# Patient Record
Sex: FEMALE | Race: WHITE | Employment: PART TIME | URBAN - METROPOLITAN AREA
[De-identification: names, ages, dates, MRNs, and addresses within clinical notes are randomized per-mention and may not be internally consistent; named-entity substitution may affect disease eponyms.]

---

## 2019-05-17 ENCOUNTER — HOSPITAL ENCOUNTER (EMERGENCY)
Facility: CLINIC | Age: 29
Discharge: HOME | End: 2019-05-17
Attending: PHYSICIAN ASSISTANT | Admitting: PHYSICIAN ASSISTANT

## 2019-05-17 VITALS
SYSTOLIC BLOOD PRESSURE: 115 MMHG | HEART RATE: 109 BPM | RESPIRATION RATE: 16 BRPM | OXYGEN SATURATION: 96 % | WEIGHT: 235 LBS | DIASTOLIC BLOOD PRESSURE: 79 MMHG

## 2019-05-17 DIAGNOSIS — R04.0 EPISTAXIS: ICD-10-CM

## 2019-05-17 PROCEDURE — 99283 EMERGENCY DEPT VISIT LOW MDM: CPT

## 2019-05-17 RX ORDER — LORATADINE 10 MG/1
10 TABLET ORAL DAILY
COMMUNITY

## 2019-05-17 RX ORDER — TRANEXAMIC ACID 100 MG/ML
500 INJECTION, SOLUTION INTRAVENOUS ONCE
Status: DISCONTINUED | OUTPATIENT
Start: 2019-05-17 | End: 2019-05-17 | Stop reason: HOSPADM

## 2019-05-17 RX ORDER — CHOLECALCIFEROL (VITAMIN D3) 1250 MCG
50000 CAPSULE ORAL
COMMUNITY

## 2019-05-17 RX ORDER — ESCITALOPRAM OXALATE 5 MG/1
5 TABLET ORAL DAILY
COMMUNITY

## 2019-05-17 RX ORDER — CALCIUM CARBONATE 500(1250)
1 TABLET ORAL 2 TIMES DAILY
COMMUNITY

## 2019-05-17 RX ORDER — OXYMETAZOLINE HYDROCHLORIDE 0.05 G/100ML
SPRAY NASAL
Status: DISCONTINUED
Start: 2019-05-17 | End: 2019-05-17 | Stop reason: HOSPADM

## 2019-05-17 NOTE — ED TRIAGE NOTES
Nose bleed started this am about 8:30, lasted about 35 minutes.  Second nose bleed at 9:30.  Third nose bleed started about 12 noon.  Pt denies taking blood thinner.

## 2019-05-17 NOTE — DISCHARGE INSTRUCTIONS
"*Avoid picking your nose or blowing it hard.  *No new medications. Vaseline or antibiotic ointment to the inside of the nares nightly.   If you have a nosebleed, expel all clots, spray Afrin, and place nasal clamp for 15 minutes. You may replace the clamp if persistent bleeding for another 15-20 minutes.  If you have persistent bleeding return to the emergency department.  *Follow-up with ENT in the next 1-2 weeks for recurrent nosebleeds.  *Return if you develop recurrence, faint or feel like you will faint or become worse in any way.      Discharge Instructions  Epistaxis    Today you were seen for a nosebleed.   Nosebleeds (the medical term is \"epistaxis\") are very common. Almost every person has had at least one in their lifetime.  Although the amount of blood loss can appear dramatic, nosebleeds rarely cause serious problems.  The most common causes are dry air or nose picking, but they also are common in people who have allergies, high blood pressure or are on blood thinners, such as Coumadin, Aspirin or Plavix. If you or your child gets a nosebleed, the important thing is to know how to take care of it. With the right self-care, most nosebleeds will stop on their own.    Return to the Emergency Department if:  Your nose is bleeding a very large amount of blood.  You get very pale, faint, or tired.  You cannot get the bleeding to stop after following these instructions.  A nosebleed happens after recent nasal surgery or if you have a known nasal tumor.  You have new, serious symptoms, such as chest pain.  You get a nosebleed after an injury, such as being hit in the face, and you are concerned that you could have other injuries (like a broken bone).    Treatment:  Your doctor may tell you to use a decongestant nose spray, like Afrin  (oxymetazoline), in both nostrils in the morning and at night for the next three days. Do not use this medicine for more than three days at a time.  If you do it will cause nasal " congestion.   You should apply a small amount of Vaseline  to the inside of your nostrils for moisture before bed.  Using a humidifier in your bedroom will help as well.  For the next three days, do not blow your nose or put anything in your nose. You may sniffle, or dab the outside of your nose.  Do not bend with your head below your waist for the next three days. Do not lift anything so heavy that you have to strain.   If you received nasal packing, please do not remove the packing until seen by an Ear Nose and Throat specialist.  If antibiotics have been given with the packing please take as directed.    If your nose starts to bleed again:  Blow your nose to get rid of some of the clots that have formed inside your nostrils. This may increase the bleeding temporarily, but that's OK.  Spray decongestant nose spray (like Afrin ) into both nostrils to constrict the blood vessels.  Sit or stand while bending forward slightly at the waist. Do not lie down or tilt your head back. This may cause you to swallow blood and can lead to vomiting.   the soft part of BOTH nostrils at the bottom of your nose and squeeze your nose closed for at least 5 minutes (for children) or 10 to 15 minutes (for adults). You can use your fingers, or the clip we gave you here. Use a clock to time yourself. Do not release the pressure every so often to check whether the bleeding has stopped. Many people hurt their chances of stopping the bleeding by releasing the pressure too soon or too often.    If you follow the steps outlined above, and your nose continues to bleed, repeat all the steps once more. Apply pressure for a total of at least 30 minutes. If you continue to bleed even then, return to the Emergency Department or go to an urgent care clinic.    If you keep having nose bleeds, schedule an appointment with your regular doctor, or with an Ear, Nose, and Throat Specialist.  If you were given a prescription for medicine here today,  be sure to read all of the information (including the package insert) that comes with your prescription.  This will include important information about the medicine, its side effects, and any warnings that you need to know about.  The pharmacist who fills the prescription can provide more information and answer questions you may have about the medicine.  If you have questions or concerns that the pharmacist cannot address, please call or return to the Emergency Department.

## 2019-05-17 NOTE — ED PROVIDER NOTES
History     Chief Complaint:  Epistaxis    HPI   Ludin Elise is a generally healthy 28 year old female who presents with epistaxis. The patient states that she has a history of epistaxis, but notes that they usually resolve after 25 minutes. She has not followed with ENT for this issue nor required ED interventions in the past. Today, the patient states that she had one episode of epistaxis at 0830 and resolved after a half hour, a second episode of epistaxis at 0930 that was smaller and resolved at a half hour. At 1130 today, the patient notes that she had developed the worst epistaxis she has had with no relief, prompting her ED visit. Here, the patient denies any associated symptoms. She is not currently on any blood thinners nor take daily aspirin or ibuprofen.     Allergies:  NKDA     Medications:    The patient is currently on no regular medications.     Past Medical History:    The patient denies any significant past medical history.     Past Surgical History:    The patient does not have any pertinent past surgical history.     Family History:    No past pertinent family history.     Social History:  Presents with her friend.  Negative for alcohol use.  Negative for tobacco use.     Review of Systems   HENT: Positive for nosebleeds.    All other systems reviewed and are negative.      Physical Exam     Patient Vitals for the past 24 hrs:   BP Pulse Resp SpO2 Weight   05/17/19 1228 -- 109 -- 96 % --   05/17/19 1225 115/79 -- 16 -- 106.6 kg (235 lb)      Physical Exam  General: Alert, interactive. GCS 15  Head:  Scalp is atraumatic.  Eyes:  EOM intact. The pupils are equal, round, and reactive to light. No scleral icterus.  ENT:                                      Ears:  The external ears are normal. TM's non-erythematous. External canals normal.    Nose:  The external nose is normal.  Right naris unremarkable.  Left nares with friable tissue to Kiesselbach's plexus.  No septal hematoma.  Throat:  The  oropharynx is normal. Mucus membranes are moist.                 Neck:  Normal range of motion. There is no rigidity.   CV:  Regular rate and rhythm. No murmur. 2+ radial pulses  Resp:  Breath sounds are clear bilaterally. Non-labored, no retractions or accessory muscle use.  MS:  Normal range of motion.   Skin:  Warm and dry.   Neuro:  Strength and sensation grossly intact.   Psych:  Awake. Alert.  Appropriate interactions.     Emergency Department Course   Interventions:  1257 Tranexamic acid, cotton ball intranasal  1257 Afrin 0.05%, cotton ball intranasal     Emergency Department Course:  Nursing notes and vitals reviewed. Nasal clamp placed.    1257  I performed an exam of the patient as documented above.     1302 Nasal clamped removed and bleeding has resolved.     Medicine administered as documented above.     1325 I rechecked the patient and discussed the results of her workup thus far.     Findings and plan explained to the Patient. Patient discharged home with instructions regarding supportive care, medications, and reasons to return. The importance of close follow-up was reviewed.     Impression & Plan      Medical Decision Making:  Ludin Elise is a 28 year old female who presents for evaluation of nasal bleeding and epistaxis.  Patient had no further bleeding after 30 minutes of nasal clamp.  Empirically, the nose was treated with lidocaine with epinephrine, TXA, and Afrin soaked cotton balls and the clamp was replaced for 10 minutes.  No further bleeding here in the emergency department.  Discussed home care management including Vaseline to the nares before bed.   There are no signs of coagulopathy causing the bleeding or a general medical condition (thrombocytopenia, DIC, leukemia, etc) causing the bleeding today.  Follow-up with ENT for recurrent nosebleeds.  Return precautions discussed.  Patient agrees with the plan all questions and concerns addressed prior to discharge home.    Diagnosis:     ICD-10-CM   1. Epistaxis R04.0       Disposition:  discharged to home    I, Dania Murphy, am serving as a scribe on 5/17/2019 at 1:30 PM to personally document services performed by Sylvia Rice PA-C based on my observations and the provider's statements to me.      Dania Murphy  5/17/2019   [facility] EMERGENCY DEPARTMENT       Sylvia Rice PA-C  05/17/19 1841

## 2019-05-17 NOTE — ED AVS SNAPSHOT
[facility] Emergency Department  [address]  [facility] [address]  Phone:  [phone]  Fax:  [phone]                                    Ludin Elise   MRN: [id number]    Department:  [facility] Emergency Department   Date of Visit:  5/17/2019           After Visit Summary Signature Page    I have received my discharge instructions, and my questions have been answered. I have discussed any challenges I see with this plan with the nurse or doctor.    ..........................................................................................................................................  Patient/Patient Representative Signature      ..........................................................................................................................................  Patient Representative Print Name and Relationship to Patient    ..................................................               ................................................  Date                                   Time    ..........................................................................................................................................  Reviewed by Signature/Title    ...................................................              ..............................................  Date                                               Time          22EPIC Rev 08/18

## 2023-12-12 ENCOUNTER — NURSE TRIAGE (OUTPATIENT)
Dept: NURSING | Facility: CLINIC | Age: 33
End: 2023-12-12

## 2023-12-13 NOTE — TELEPHONE ENCOUNTER
Patient calling reports she was bit by a wild mouse. Denies major bleeding. Advised per protocol to go to the ER with any bite from an animal with unknown vaccine history. Patient agreeable to the plan.     Riana Cardoza RN 12/12/23 8:45 PM    Health Triage Nurse Advisor      Reason for Disposition   [1] Any break in skin from BITE (e.g., cut, puncture or scratch) AND[2] WILD animal at risk for RABIES (e.g., bat, raccoon, gonzalez, skunk, coyote, other carnivores; see Background for list.)    Additional Information   Negative: [1] Major bleeding (e.g., actively dripping or spurting) AND [2] can't be stopped   Negative: Sounds like a life-threatening emergency to the triager   Negative: Snake bite   Negative: Bite, wound, or sting from fish    Protocols used: Animal Bite-A-

## 2024-05-23 ENCOUNTER — NURSE TRIAGE (OUTPATIENT)
Dept: NURSING | Facility: CLINIC | Age: 34
End: 2024-05-23

## 2024-05-24 NOTE — TELEPHONE ENCOUNTER
Nurse Triage SBAR    Is this a 2nd Level Triage? NO    Situation: Tosillectomy Post Op    Background: :Patient is post op 6 days    Assessment: Patient calling to report pain not improving, 7/10 pain with alternating ibuprofen/acetaminophen and oxycodone. She reports that she is having pain with swallowing, but is staying hydrated. No access to a thermometer. Denies difficulty breathing, bleeding, drooling, or vomiting.    Protocol Recommended Disposition:   According to the protocol, patient should call surgeon office in the morning.  Care advice given.     CALL BACK IF: * Any bleeding from the mouth or nose * Any trouble breathing * Signs of dehydration * Fever goes above 104 F (40 C) * You become worse     Patient verbalizes understanding and agrees with plan of care.     Mariah Gonzalez RN  05/23/24 8:56 PM  [facility] Nurse Advisor    Reason for Disposition   [1] Caller has NON-URGENT question AND [2] triager unable to answer question    Additional Information   Negative: SEVERE difficulty breathing (e.g., struggling for each breath, speaks in single words, stridor)   Negative: SEVERE bleeding (e.g., spitting out, coughing up, or vomiting a LARGE AMOUNT of blood; such as continuous fresh bleeding or large blood clots)   Negative: [1] Bleeding AND [2] fainted or too weak to stand   Negative: Sounds like a life-threatening emergency to the triager   Negative: Tonsil injury not from surgery   Negative: [1] MODERATE bleeding (e.g., spitting out or coughing up SMALL AMOUNT of fresh blood) AND [2] one or more times  (Exception: Blood-tinged saliva.)   Negative: [1] Vomiting fresh blood or old blood (coffee-ground vomit) AND [2] small amount one or more times   Negative: Difficulty breathing   Negative: Sounds like a serious complication to the triager   Negative: SEVERE PAIN WITH DYSPHAGIA (e.g., can't swallow any liquids, or drooling)   Negative: [1] Drinking very little AND [2] dehydration suspected  (e.g., no urine > 12 hours, very dry mouth, very lightheaded)   Negative: Fever > 104 F (40 C)   Negative: Patient sounds very sick or weak to the triager   Negative: [1] SEVERE pain (e.g., excruciating, pain scale 8-10) AND [2] not controlled with pain medications   Negative: MODERATE-SEVERE vomiting (e.g., 3 or more times)   Negative: Vomiting lasts > 12 hours   Negative: [1] Refuses to drink anything AND [2] for > 12 hours   Negative: [1] Caller has URGENT question AND [2] triager unable to answer question   Negative: [1] Fever returns after gone for over 24 hours AND [2] symptoms worse or not improved   Negative: Fever present > 3 days (72 hours)   Negative: [1] Big lymph nodes in neck AND [2] new-onset   Negative: [1] Over 48 hours since surgery AND [2] pain is becoming worse   Negative: Fever > 101 F (38.3 C)    Protocols used[facility]

## 2024-06-14 ENCOUNTER — HOSPITAL ENCOUNTER (OUTPATIENT)
Facility: CLINIC | Age: 34
Setting detail: OBSERVATION
Discharge: HOME | End: 2024-06-15
Attending: STUDENT IN AN ORGANIZED HEALTH CARE EDUCATION/TRAINING PROGRAM | Admitting: STUDENT IN AN ORGANIZED HEALTH CARE EDUCATION/TRAINING PROGRAM
Payer: COMMERCIAL

## 2024-06-14 ENCOUNTER — TELEPHONE (OUTPATIENT)
Dept: FAMILY MEDICINE | Facility: CLINIC | Age: 34
End: 2024-06-14
Payer: COMMERCIAL

## 2024-06-14 PROBLEM — R10.9 ABDOMINAL PAIN: Status: ACTIVE | Noted: 2024-06-14

## 2024-06-14 LAB
ALBUMIN SERPL BCG-MCNC: 4.1 G/DL (ref 3.5–5.2)
ALP SERPL-CCNC: 96 U/L (ref 40–150)
ALT SERPL W P-5'-P-CCNC: 14 U/L (ref 0–50)
ANION GAP SERPL CALCULATED.3IONS-SCNC: 14 MMOL/L (ref 7–15)
AST SERPL W P-5'-P-CCNC: 12 U/L (ref 0–45)
BILIRUB SERPL-MCNC: 0.7 MG/DL
BUN SERPL-MCNC: 10.3 MG/DL (ref 6–20)
CALCIUM SERPL-MCNC: 8.9 MG/DL (ref 8.6–10)
CHLORIDE SERPL-SCNC: 100 MMOL/L (ref 98–107)
CREAT SERPL-MCNC: 0.66 MG/DL (ref 0.51–0.95)
DEPRECATED HCO3 PLAS-SCNC: 22 MMOL/L (ref 22–29)
EGFRCR SERPLBLD CKD-EPI 2021: >90 ML/MIN/1.73M2
ERYTHROCYTE [DISTWIDTH] IN BLOOD BY AUTOMATED COUNT: 12.7 % (ref 10–15)
GLUCOSE SERPL-MCNC: 97 MG/DL (ref 70–99)
HCT VFR BLD AUTO: 35.1 % (ref 35–47)
HGB BLD-MCNC: 11.5 G/DL (ref 11.7–15.7)
LIPASE SERPL-CCNC: 24 U/L (ref 13–60)
MCH RBC QN AUTO: 30.3 PG (ref 26.5–33)
MCHC RBC AUTO-ENTMCNC: 32.8 G/DL (ref 31.5–36.5)
MCV RBC AUTO: 93 FL (ref 78–100)
PLATELET # BLD AUTO: 268 10E3/UL (ref 150–450)
POTASSIUM SERPL-SCNC: 3.7 MMOL/L (ref 3.4–5.3)
PROT SERPL-MCNC: 7.1 G/DL (ref 6.4–8.3)
RBC # BLD AUTO: 3.79 10E6/UL (ref 3.8–5.2)
SODIUM SERPL-SCNC: 136 MMOL/L (ref 135–145)
WBC # BLD AUTO: 8.6 10E3/UL (ref 4–11)

## 2024-06-14 PROCEDURE — 83690 ASSAY OF LIPASE: CPT | Performed by: STUDENT IN AN ORGANIZED HEALTH CARE EDUCATION/TRAINING PROGRAM

## 2024-06-14 PROCEDURE — 87637 SARSCOV2&INF A&B&RSV AMP PRB: CPT | Performed by: STUDENT IN AN ORGANIZED HEALTH CARE EDUCATION/TRAINING PROGRAM

## 2024-06-14 PROCEDURE — G0378 HOSPITAL OBSERVATION PER HR: HCPCS

## 2024-06-14 PROCEDURE — 36415 COLL VENOUS BLD VENIPUNCTURE: CPT | Performed by: STUDENT IN AN ORGANIZED HEALTH CARE EDUCATION/TRAINING PROGRAM

## 2024-06-14 PROCEDURE — 87040 BLOOD CULTURE FOR BACTERIA: CPT | Performed by: STUDENT IN AN ORGANIZED HEALTH CARE EDUCATION/TRAINING PROGRAM

## 2024-06-14 PROCEDURE — 85027 COMPLETE CBC AUTOMATED: CPT | Performed by: STUDENT IN AN ORGANIZED HEALTH CARE EDUCATION/TRAINING PROGRAM

## 2024-06-14 PROCEDURE — 99222 1ST HOSP IP/OBS MODERATE 55: CPT | Performed by: STUDENT IN AN ORGANIZED HEALTH CARE EDUCATION/TRAINING PROGRAM

## 2024-06-14 PROCEDURE — G0379 DIRECT REFER HOSPITAL OBSERV: HCPCS

## 2024-06-14 PROCEDURE — 80053 COMPREHEN METABOLIC PANEL: CPT | Performed by: STUDENT IN AN ORGANIZED HEALTH CARE EDUCATION/TRAINING PROGRAM

## 2024-06-14 RX ORDER — ONDANSETRON 2 MG/ML
4 INJECTION INTRAMUSCULAR; INTRAVENOUS EVERY 6 HOURS PRN
Status: DISCONTINUED | OUTPATIENT
Start: 2024-06-14 | End: 2024-06-15 | Stop reason: HOSPADM

## 2024-06-14 RX ORDER — ONDANSETRON 4 MG/1
4 TABLET, ORALLY DISINTEGRATING ORAL EVERY 6 HOURS PRN
Status: DISCONTINUED | OUTPATIENT
Start: 2024-06-14 | End: 2024-06-15 | Stop reason: HOSPADM

## 2024-06-14 RX ORDER — AMOXICILLIN 250 MG
1 CAPSULE ORAL 2 TIMES DAILY PRN
Status: DISCONTINUED | OUTPATIENT
Start: 2024-06-14 | End: 2024-06-15 | Stop reason: HOSPADM

## 2024-06-14 RX ORDER — AMOXICILLIN 250 MG
2 CAPSULE ORAL 2 TIMES DAILY PRN
Status: DISCONTINUED | OUTPATIENT
Start: 2024-06-14 | End: 2024-06-15 | Stop reason: HOSPADM

## 2024-06-14 ASSESSMENT — ACTIVITIES OF DAILY LIVING (ADL)
ADLS_ACUITY_SCORE: 35
ADLS_ACUITY_SCORE: 35

## 2024-06-15 ENCOUNTER — APPOINTMENT (OUTPATIENT)
Dept: CT IMAGING | Facility: CLINIC | Age: 34
End: 2024-06-15
Attending: STUDENT IN AN ORGANIZED HEALTH CARE EDUCATION/TRAINING PROGRAM
Payer: COMMERCIAL

## 2024-06-15 VITALS
DIASTOLIC BLOOD PRESSURE: 57 MMHG | HEART RATE: 74 BPM | TEMPERATURE: 98.6 F | OXYGEN SATURATION: 97 % | SYSTOLIC BLOOD PRESSURE: 115 MMHG | RESPIRATION RATE: 20 BRPM

## 2024-06-15 LAB
C DIFF TOX B STL QL: NEGATIVE
FLUAV RNA SPEC QL NAA+PROBE: NEGATIVE
FLUBV RNA RESP QL NAA+PROBE: NEGATIVE
RSV RNA SPEC NAA+PROBE: NEGATIVE
SARS-COV-2 RNA RESP QL NAA+PROBE: NEGATIVE

## 2024-06-15 PROCEDURE — 87493 C DIFF AMPLIFIED PROBE: CPT | Performed by: INTERNAL MEDICINE

## 2024-06-15 PROCEDURE — G0378 HOSPITAL OBSERVATION PER HR: HCPCS

## 2024-06-15 PROCEDURE — 74176 CT ABD & PELVIS W/O CONTRAST: CPT

## 2024-06-15 PROCEDURE — 99232 SBSQ HOSP IP/OBS MODERATE 35: CPT | Performed by: STUDENT IN AN ORGANIZED HEALTH CARE EDUCATION/TRAINING PROGRAM

## 2024-06-15 PROCEDURE — 99238 HOSP IP/OBS DSCHRG MGMT 30/<: CPT | Performed by: STUDENT IN AN ORGANIZED HEALTH CARE EDUCATION/TRAINING PROGRAM

## 2024-06-15 RX ORDER — ESCITALOPRAM OXALATE 20 MG/1
20 TABLET ORAL DAILY
COMMUNITY

## 2024-06-15 RX ORDER — CETIRIZINE HYDROCHLORIDE 10 MG/1
10 TABLET ORAL DAILY
COMMUNITY

## 2024-06-15 ASSESSMENT — ACTIVITIES OF DAILY LIVING (ADL)
ADLS_ACUITY_SCORE: 18
ADLS_ACUITY_SCORE: 19
ADLS_ACUITY_SCORE: 18

## 2024-06-15 NOTE — PHARMACY-ADMISSION MEDICATION HISTORY
Pharmacy Intern Admission Medication History    Admission medication history is complete. The information provided in this note is only as accurate as the sources available at the time of the update.    Information Source(s): Patient via in-person    Pertinent Information: No fill Hx    Changes made to PTA medication list:  Added: whole list  Deleted: None  Changed: None    Allergies reviewed with patient and updates made in EHR: yes    Medication History Completed By: Zana Miles 6/15/2024 8:31 AM    PTA Med List   Medication Sig Last Dose    cetirizine (ZYRTEC) 10 MG tablet Take 10 mg by mouth daily 6/14/2024 at am    escitalopram (LEXAPRO) 20 MG tablet Take 20 mg by mouth daily 6/14/2024 at am

## 2024-06-15 NOTE — PLAN OF CARE
PRIMARY DIAGNOSIS: Abd pain   OUTPATIENT/OBSERVATION GOALS TO BE MET BEFORE DISCHARGE:  ADLs back to baseline: Yes    Activity and level of assistance: Ambulating independently.    Pain status: Pain free.    Return to near baseline physical activity: Yes     Discharge Planner Nurse   Safe discharge environment identified: Yes  Barriers to discharge: Yes, waiting on cdiff results       Entered by: Rachell Harris RN 06/15/2024 2:19 PM   VSS on room air. Up ind. Tolerating reg diet. Possible discharge today still. Will continue with plan of care  Please review provider order for any additional goals.   Nurse to notify provider when observation goals have been met and patient is ready for discharge.

## 2024-06-15 NOTE — TELEPHONE ENCOUNTER
Internal Medicine    I received a call from Dr. Yaz Osman at [facility] for direct admission for further work up of patient with a fever and colitis.    33 year old female with a history of Rheumatoid Arthritis and Chronic Diarrhea for which a recent normal colonoscopy was reported to me who presented to  today with abdominal pain, diarrhea and fever of 100.1.  Lipase was elevated at 114 with otherwise unremarkable labs reported.  CT scan revealed Sigmoid Colitis.  No mediations were reported to have been administered.  I am unable to review any labs or images in Care Everywhere at this time.    Ayanna Hines MS PA-C  Hospitalist Service

## 2024-06-15 NOTE — PLAN OF CARE
"Goal Outcome Evaluation:    A&O x4   VSS on RA   NPO   CT of ABD completed results pending   Denies nausea   Independent within room  Saline locked   Influenza & covid swab collected & sent to lab, waiting for isolation supplies to arrive     Problem: Adult Inpatient Plan of Care  Goal: Plan of Care Review  Description: The Plan of Care Review/Shift note should be completed every shift.  The Outcome Evaluation is a brief statement about your assessment that the patient is improving, declining, or no change.  This information will be displayed automatically on your shift  note.  Outcome: Progressing  Goal: Patient-Specific Goal (Individualized)  Description: You can add care plan individualizations to a care plan. Examples of Individualization might be:  \"Parent requests to be called daily at 9am for status\", \"I have a hard time hearing out of my right ear\", or \"Do not touch me to wake me up as it startles  me\".  Outcome: Progressing  Goal: Absence of Hospital-Acquired Illness or Injury  Outcome: Progressing  Intervention: Identify and Manage Fall Risk  Recent Flowsheet Documentation  Taken 6/14/2024 2157 by Madelin Zazueta RN  Safety Promotion/Fall Prevention: clutter free environment maintained  Intervention: Prevent Skin Injury  Recent Flowsheet Documentation  Taken 6/14/2024 2100 by Madelin Zazueta, RN  Body Position: position changed independently  Intervention: Prevent and Manage VTE (Venous Thromboembolism) Risk  Recent Flowsheet Documentation  Taken 6/14/2024 2157 by Madelin Zazueta, RN  VTE Prevention/Management: SCDs (sequential compression devices) off  Goal: Optimal Comfort and Wellbeing  Outcome: Progressing  Goal: Readiness for Transition of Care  Outcome: Progressing                           "

## 2024-06-15 NOTE — H&P
[facility]    History and Physical - Hospitalist Service       Date of Admission:  6/14/2024    Assessment & Plan      Ludin Dhaliwal is a 33 year old female past medical history significant for anxiety and rheumatoid arthritis who was on sulfasalazine and sulindac until a month ago who was a direct admit from outside urgent care for the management of colitis.  No prior notes, labs, imaging available to review.      Acute  on chronic abdominal pain  Acute on chronic diarrhea  Family history of Crohn's disease  Reported 6-month history of abdominal pain and diarrhea.  Per patient she had a colonoscopy about 8 weeks ago due to concerns for Crohn's disease.  She was found to have polyps which were removed and she was advised to come back in a year.  No records available to review.  She said that her symptoms gotten worse acutely yesterday when she started having pain in her abdomen at multiple spots.  She does not think that her diarrhea gotten worse too.  She reported chills but no fever.  She had been on sulfasalazine and sulindac but had not been taking it for about a month.  She presented to the outside urgent care and underwent CT abdomen and pelvis with contrast and was told to have colitis.  Patient was a direct admit from outside urgent care.  At the time of admission no records were available to review.  Admission labs showed no leukocytosis, hemoglobin low at 11.5, unknown baseline.  Metabolic panel unremarkable.  Blood cultures in process    -Admit to obs  - Follow-up blood cultures  -CT abdomen and pelvis without  contrast  - Further management pending upon above test results    rheumatoid arthritis  No records available to review.  Per patient she was on sulfasalazine and sulindac which she had not been taking for about a month.  She had a recent tonsillectomy and was told to stop taking it 10 days prior.  After the surgery she never resumed these medications.  She has her usual aches  and pains but denied any significant joint swelling.  Low suspicion of flare at this time.  Will continue to monitor clinically.  - Outpatient rheumatology follow-up      Anxiety   -Patient reported that she takes Lexapro.  Awaiting pharmacy med reconciliation.         Observation Goals: -diagnostic tests and consults completed and resulted, -vital signs normal or at patient baseline, Nurse to notify provider when observation goals have been met and patient is ready for discharge.  Diet: NPO for Medical/Clinical Reasons Except for: Ice Chips, Meds    DVT Prophylaxis: Pneumatic Compression Devices  Emery Catheter: Not present  Lines: None     Cardiac Monitoring: None  Code Status: Full Code      Clinically Significant Risk Factors Present on Admission                                         Disposition Plan     Medically Ready for Discharge: Anticipated Tomorrow           Kia Childress MD  Hospitalist Service  [facility]  Securely message with [employer] (more info)  Text page via [facility] Paging/Directory     ______________________________________________________________________    Chief Complaint   Worsening abdominal pain and diarrhea    History is obtained from the patient    History of Present Illness     Ludin Dhaliwal is a 33 year old female past medical history significant for anxiety and rheumatoid arthritis who was on sulfasalazine and sulindac until a month ago who was a direct admit from outside urgent care for the management of colitis.  No prior notes, labs, imaging available to review.    Reported 6-month history of abdominal pain and diarrhea.  Per patient she had a colonoscopy about 8 weeks ago due to concerns for Crohn's disease.  She was found to have polyps which were removed and she was advised to come back in a year.  No records available to review.  She said that her symptoms gotten worse acutely yesterday when she started having pain in her abdomen at multiple spots.  She does not  think that her diarrhea gotten worse too.  She reported chills but no fever.  She had been on sulfasalazine and sulindac but had not been taking it for about a month.  She presented to the outside urgent care and underwent CT abdomen and pelvis with contrast and was told to have colitis.  Patient was a direct admit from outside urgent care.  At the time of admission no records were available to review.  Admission labs showed no leukocytosis, hemoglobin low at 11.5, unknown baseline.  Metabolic panel unremarkable.  Blood cultures in process.         Past Medical History    No past medical history on file.    Past Surgical History   No past surgical history on file.    Prior to Admission Medications   None        Review of Systems    The 10 point Review of Systems is negative other than noted in the HPI or here.      Physical Exam   Vital Signs: Temp: 99.1  F (37.3  C) Temp src: Temporal BP: 134/54 Pulse: 94   Resp: 16 SpO2: 96 % O2 Device: None (Room air)    Weight: 0 lbs 0 oz    Constitutional: awake, alert, cooperative, no apparent distress, and appears stated age  Eyes: Anicteric sclera  ENT: normocephalic, without obvious abnormality  Respiratory: On room air, equal air entry bilaterally, no wheezing or crackles  Cardiovascular: Normal rate, regular rhythm, no murmur  GI: Obese, soft, nontender, nondistended  Musculoskeletal: no lower extremity pitting edema present  Neurologic: Awake, alert, oriented to name, place and time.  Moving all 4 extremities  Neuropsychiatric: Appropriate mood and affect    Medical Decision Making       60 MINUTES SPENT BY ME on the date of service doing chart review, history, exam, documentation & further activities per the note.      Data   ------------------------- PAST 24 HR DATA REVIEWED -----------------------------------------------

## 2024-06-15 NOTE — PLAN OF CARE
"Goal Outcome Evaluation:      Plan of Care Reviewed With: patient    Overall Patient Progress: improvingOverall Patient Progress: improving    Outcome Evaluation: Pt rates pain @ 2, tolerable, NPO, GI consult.      Problem: Adult Inpatient Plan of Care  Goal: Plan of Care Review  Description: The Plan of Care Review/Shift note should be completed every shift.  The Outcome Evaluation is a brief statement about your assessment that the patient is improving, declining, or no change.  This information will be displayed automatically on your shift  note.  Outcome: Progressing  Flowsheets (Taken 6/15/2024 0430)  Outcome Evaluation: Pt rates pain @ 2, tolerable, NPO, GI consult.  Plan of Care Reviewed With: patient  Overall Patient Progress: improving  Goal: Patient-Specific Goal (Individualized)  Description: You can add care plan individualizations to a care plan. Examples of Individualization might be:  \"Parent requests to be called daily at 9am for status\", \"I have a hard time hearing out of my right ear\", or \"Do not touch me to wake me up as it startles  me\".  Outcome: Progressing  Goal: Absence of Hospital-Acquired Illness or Injury  Outcome: Progressing  Intervention: Identify and Manage Fall Risk  Recent Flowsheet Documentation  Taken 6/15/2024 0412 by Ramon Ambrose, RN  Safety Promotion/Fall Prevention: clutter free environment maintained  Intervention: Prevent and Manage VTE (Venous Thromboembolism) Risk  Recent Flowsheet Documentation  Taken 6/15/2024 0412 by Ramon Ambrose, RN  VTE Prevention/Management: SCDs (sequential compression devices) off  Goal: Optimal Comfort and Wellbeing  Outcome: Progressing  Goal: Readiness for Transition of Care  Outcome: Progressing  Intervention: Mutually Develop Transition Plan  Recent Flowsheet Documentation  Taken 6/15/2024 0058 by Ramon Ambrose, RN  Equipment Currently Used at Home: none     Problem: Pain Acute  Goal: Optimal Pain Control and Function  Outcome: " Progressing  Intervention: Prevent or Manage Pain  Recent Flowsheet Documentation  Taken 6/15/2024 0412 by Ramon Ambrose, RN  Medication Review/Management: medications reviewed     Problem: Diarrhea  Goal: Effective Diarrhea Management  Outcome: Progressing  Intervention: Manage Diarrhea  Recent Flowsheet Documentation  Taken 6/15/2024 0412 by Ramon Ambrose, RN  Medication Review/Management: medications reviewed

## 2024-06-15 NOTE — PROGRESS NOTES
[facility]    Medicine Progress Note - Hospitalist Service    Date of Admission:  6/14/2024    Assessment & Plan   33-year-old female with history of anxiety and rheumatoid arthritis who had tonsillectomy about a month ago when she stopped taking her sulfasalazine and sulindac.  She has been having diarrhea for the last 6 months.  She had a colonoscopy 2 months ago and had some polyps removed and was asked to have repeat colonoscopy in a year.    She came to the ER for abdominal pain for last 2 days.  Diarrhea is about the same as baseline with no blood in the stools.  She had CT at urgent care which showed colitis and was referred to ER.    Acute on chronic abdominal pain.  Chronic diarrhea.  Discussed with GI, will rule out C. difficile and if she tolerates regular diet, she will be discharged home.  No abnormalities of the polyps were seen on colonoscopy 2 months ago.  Is possible that she has underlying inflammatory bowel disease which is under control with sulfasalazine which she was taking with rheumatoid arthritis and once she started her IBD flared up.    Rheumatoid arthritis.  Has not been taking sulfasalazine and sulindac for about 5 weeks now which she had stopped for tonsillectomy but never restarted.    Anxiety disorder.  Resume Lexapro.         Observation Goals: -diagnostic tests and consults completed and resulted, -vital signs normal or at patient baseline, Nurse to notify provider when observation goals have been met and patient is ready for discharge.  Diet: Regular Diet Adult  Diet    DVT Prophylaxis: Pneumatic Compression Devices  Emery Catheter: Not present  Lines: None     Cardiac Monitoring: None  Code Status: Full Code      Clinically Significant Risk Factors Present on Admission                                         Disposition Plan     Medically Ready for Discharge: Anticipated Today             Justino Nj MD  Hospitalist Service  [facility]  Hospital  Securely message with babbel (more info)  Text page via [employer] Paging/Directory   ______________________________________________________________________    Interval History   Abdominal pain is improved.    Physical Exam   Vital Signs: Temp: 98.1  F (36.7  C) Temp src: Oral BP: 106/56 Pulse: 67   Resp: 20 SpO2: 98 % O2 Device: None (Room air)    Weight: 0 lbs 0 oz    General Appearance: Alert awake and x 3  Respiratory: Clear to auscultation  Cardiovascular: S1-S2 normal  GI: Soft and nontender  Skin: No rash  Other: No edema    Medical Decision Making       MANAGEMENT DISCUSSED with the following over the past 24 hours: Patient, RN and GI       Data     I have personally reviewed the following data over the past 24 hrs:    8.6  \   11.5 (L)   / 268     136 100 10.3 /  97   3.7 22 0.66 \     ALT: 14 AST: 12 AP: 96 TBILI: 0.7   ALB: 4.1 TOT PROTEIN: 7.1 LIPASE: 24       Imaging results reviewed over the past 24 hrs:   Recent Results (from the past 24 hour(s))   CT Abdomen Pelvis w/o Contrast    Narrative    EXAM: CT ABDOMEN PELVIS W/O CONTRAST  LOCATION: [facility]  DATE: 6/15/2024    INDICATION: Per patient, she just had contrast CT. No records available, admitted with colitis.  COMPARISON: None available.  TECHNIQUE: CT scan of the abdomen and pelvis was performed without intravenous contrast Multiplanar reformats were obtained. Dose reduction techniques were used.    FINDINGS:   LOWER CHEST: Lung bases are clear.    ABDOMEN/PELVIS: Limited evaluation of the abdominal organs due to lack of intravenous contrast.    HEPATOBILIARY: Diffuse hypodense appearance of the liver, likely due to underlying hepatic steatosis. Hyperdense material within the gallbladder, could represent gallbladder sludge versus precarious excretion of contrast.    PANCREAS: The unenhanced pancreas is grossly unremarkable.    SPLEEN: No splenomegaly.    ADRENAL GLANDS: No adrenal nodules.    KIDNEYS/BLADDER:  Contrast within the renal collecting system, precludes detailed evaluation for kidney stones. No hydronephrosis in either kidney.    BOWEL: No abnormally dilated bowel loops. Layering fluid consistency stool predominantly in the right colon, nonspecific, can be seen with gastroenteritis. The appendix is visualized and appears normal.    PERITONEUM: Small amount of free fluid in the pelvis, indeterminate, could be physiological or reactive.    PELVIC ORGANS: Unremarkable.    VASCULATURE: Unremarkable.    LYMPH NODES: Multiple mildly enlarged mesenteric lymph nodes in the right lower quadrant along the cecum, indeterminate, could be reactive.    MUSCULOSKELETAL: No suspicious osseous lesion. Small fat-containing umbilical hernia.      Impression    IMPRESSION:   1. Layering fluid consistency stool predominantly in the right colon, nonspecific, can be seen with gastroenteritis. Multiple adjacent mildly enlarged mesenteric lymph nodes, indeterminate, could be reactive.  2. Small amount of free fluid in the pelvis, indeterminate, could be physiological or reactive.  3. Hepatic steatosis.  4. Hyperdense material within the gallbladder, could represent gallbladder sludge versus precarious excretion of contrast.

## 2024-06-15 NOTE — CONSULTS
GI consult:    Ludin Dhaliwal, is a very nice 33-year-old who we are seeing in consultation due to possible possible colitis.  The patient has a history of rheumatoid arthritis for which she takes sulfasalazine and nonsteroidal anti-inflammatory agents.  She has had diarrhea for the past 6 months on an intermittent basis along with mild crampy abdominal pain and this led to a colonoscopy 8 weeks ago at [facility] at which time the patient was told she had 3 polyps, but no colitis.  She presented to the urgent care yesterday with worsening diarrhea and a CT scan was obtained which showed possible colitis although this study is not available for my review.  The patient was admitted to [facility] on this basis, i.e. diarrhea and abnormal imaging, and a repeat CT scan was obtained today which showed retained stool but no evident colitis although the quality of the scan was felt to be suboptimal.  The patient says that she has not had any bowel movement since being in the hospital and has never noticed blood in her stool.  She currently has no abdominal pain, nausea or vomiting.  She does have a good appetite and a stable weight in general.    She recently discontinued the sulfasalazine and sulindac and wonders if this might have something to do with her colitis.  The meds were discontinued because the patient had a tonsillectomy a month ago and never resumed the meds.    Currently in the hospital she is on no medication and is not receiving IV fluids.    Past medical history, family history, social history, review of systems, medications and allergies are all noted per the excellent history and physical of Kia Childress dated 6/14/2024 located in the [place] medical record and this information will not be recapitulated at this time.    Physical exam reveals a pleasant, alert oriented female in no acute distress.  Blood pressure 134/54, pulse 94 and regular, temperature afebrile, respirations  15/min.  Head neck exam normocephalic atraumatic sclera white conjunctiva pink nose and throat clear  Neck supple no JVD or thyromegaly  Chest clear to auscultation percussion  Heart exam S1 and S2 normal no S3-S4 murmurs  Abdominal exam soft nontender abdomen  Extremities no clubbing cyanosis or edema  Skin no skin rash  Neurological exam no focal neurological findings    Impression #1 possible colitis.  It is unclear to me whether that this patient actually has any type of colitis.  She had a negative colonoscopy 8 weeks ago, and has conflicting CT scan results on 2 consecutive days 1 of which apparently showed findings consistent with colitis, but this is not available for review, and the other scan done earlier today did not show colitis.  Moreover the patient has had no stools over the past several hours since admission.  On the other hand she does have diarrhea of unknown etiology and I do not believe biopsies for lymphocytic colitis were obtained at her colonoscopy 8 weeks ago unfortunately.  At this point we will try to obtain the original CT scan results, feed the patient a regular diet, and see how she does.  If the original scan does show something suggestive of colitis it could be possible viral gastroenteritis versus a self-limited bacterial infection versus something more significant such as inflammatory bowel disease.  We will see what this shows, and to sort everything out we might need to proceed with either a flexible sigmoidoscopy exam or repeat colonoscopy depending on the patient's progress.  In the meantime we will send off stool cultures for C. difficile and enteric pathogens.  With regard to the enteric pathogens the patient says she has had diarrhea for 6 months, so this is likely not going to be germane to her evaluation, nonetheless I will get the studies.    Karl Cherry MD  [phone]

## 2024-06-18 NOTE — DISCHARGE SUMMARY
[facility]  Hospitalist Discharge Summary      Date of Admission:  6/14/2024  Date of Discharge:  6/18/2024  Discharging Provider: Justino Nj MD  Discharge Service: Hospitalist Service    Discharge Diagnoses     Acute on chronic abdominal pain.  Chronic diarrhea.  Rheumatoid arthritis.  Anxiety disorder.    Clinically Significant Risk Factors          Follow-ups Needed After Discharge   Follow-up Appointments     Follow-up and recommended labs and tests       Follow up with primary care provider, No primary care provider on file.,   within 7 days for hospital follow- up.  The following labs/tests are   recommended: CBC and BMP.      Follow-up with gastroenterology in 2 weeks.            Unresulted Labs Ordered in the Past 30 Days of this Admission       Date and Time Order Name Status Description    6/14/2024 10:29 PM Blood Culture Wrist, Right Preliminary     6/14/2024 10:29 PM Blood Culture Hand, Left Preliminary         These results will be followed up by hospitalist team    Discharge Disposition   Discharged to home  Condition at discharge: Stable    Hospital Course   33-year-old female with history of anxiety and rheumatoid arthritis who had tonsillectomy about a month ago when she stopped taking her sulfasalazine and sulindac.  She has been having diarrhea for the last 6 months.  She had a colonoscopy 2 months ago and had some polyps removed and was asked to have repeat colonoscopy in a year.    She came to the ER for abdominal pain for last 2 days.  Diarrhea is about the same as baseline with no blood in the stools.  She had CT at urgent care which showed colitis and was referred to ER.    Acute on chronic abdominal pain.  Chronic diarrhea.  Discussed with GI, will ruled out C. difficile and if tolerated regular diet, she will be discharged home.  No abnormalities of the polyps were seen on colonoscopy 2 months ago.  Is possible that she has underlying inflammatory bowel disease  which is under control with sulfasalazine which she was taking with rheumatoid arthritis and once she started her IBD flared up.    Rheumatoid arthritis.  Has not been taking sulfasalazine and sulindac for about 5 weeks now which she had stopped for tonsillectomy but never restarted.  Discuss with rheumatology after discharge.    Anxiety disorder.  Resume Lexapro.      Consultations This Hospital Stay   GASTROENTEROLOGY IP CONSULT    Code Status   Prior    Time Spent on this Encounter   I, Justino Nj MD, personally saw the patient today and spent less than or equal to 30 minutes discharging this patient.       Justino Nj MD  [facility]  [address] [place] [address]  Phone: [phone]  Fax: [phone]  ______________________________________________________________________    Physical Exam   Vital Signs:                    Weight: 0 lbs 0 oz  General Appearance: Alert awake and oriented x 3  Respiratory: Clear to auscultation  Cardiovascular: S1-S2 normal  GI: Soft and nontender  Skin: No rash  Other: No edema       Primary Care Physician   [facility]    Discharge Orders      Reason for your hospital stay    Gastroenteritis     Follow-up and recommended labs and tests     Follow up with primary care provider, No primary care provider on file., within 7 days for hospital follow- up.  The following labs/tests are recommended: CBC and BMP.      Follow-up with gastroenterology in 2 weeks.     Activity    Your activity upon discharge: activity as tolerated     Diet    Follow this diet upon discharge: Orders Placed This Encounter      Regular Diet Adult       Significant Results and Procedures   Most Recent 3 CBC's:  Recent Labs   Lab Test 06/14/24  2248   WBC 8.6   HGB 11.5*   MCV 93        Most Recent 3 BMP's:  Recent Labs   Lab Test 06/14/24  2248      POTASSIUM 3.7   CHLORIDE 100   CO2 22   BUN 10.3   CR 0.66   ANIONGAP 14   ALEXX  8.9   GLC 97   ,   Results for orders placed or performed during the hospital encounter of 06/14/24   CT Abdomen Pelvis w/o Contrast    Narrative    EXAM: CT ABDOMEN PELVIS W/O CONTRAST  LOCATION: [facility]  DATE: 6/15/2024    INDICATION: Per patient, she just had contrast CT. No records available, admitted with colitis.  COMPARISON: None available.  TECHNIQUE: CT scan of the abdomen and pelvis was performed without intravenous contrast Multiplanar reformats were obtained. Dose reduction techniques were used.    FINDINGS:   LOWER CHEST: Lung bases are clear.    ABDOMEN/PELVIS: Limited evaluation of the abdominal organs due to lack of intravenous contrast.    HEPATOBILIARY: Diffuse hypodense appearance of the liver, likely due to underlying hepatic steatosis. Hyperdense material within the gallbladder, could represent gallbladder sludge versus precarious excretion of contrast.    PANCREAS: The unenhanced pancreas is grossly unremarkable.    SPLEEN: No splenomegaly.    ADRENAL GLANDS: No adrenal nodules.    KIDNEYS/BLADDER: Contrast within the renal collecting system, precludes detailed evaluation for kidney stones. No hydronephrosis in either kidney.    BOWEL: No abnormally dilated bowel loops. Layering fluid consistency stool predominantly in the right colon, nonspecific, can be seen with gastroenteritis. The appendix is visualized and appears normal.    PERITONEUM: Small amount of free fluid in the pelvis, indeterminate, could be physiological or reactive.    PELVIC ORGANS: Unremarkable.    VASCULATURE: Unremarkable.    LYMPH NODES: Multiple mildly enlarged mesenteric lymph nodes in the right lower quadrant along the cecum, indeterminate, could be reactive.    MUSCULOSKELETAL: No suspicious osseous lesion. Small fat-containing umbilical hernia.      Impression    IMPRESSION:   1. Layering fluid consistency stool predominantly in the right colon, nonspecific, can be seen with gastroenteritis.  Multiple adjacent mildly enlarged mesenteric lymph nodes, indeterminate, could be reactive.  2. Small amount of free fluid in the pelvis, indeterminate, could be physiological or reactive.  3. Hepatic steatosis.  4. Hyperdense material within the gallbladder, could represent gallbladder sludge versus precarious excretion of contrast.       Discharge Medications   Discharge Medication List as of 6/15/2024  4:31 PM        CONTINUE these medications which have NOT CHANGED    Details   cetirizine (ZYRTEC) 10 MG tablet Take 10 mg by mouth daily, Historical      escitalopram (LEXAPRO) 20 MG tablet Take 20 mg by mouth daily, Historical           Allergies   No Known Allergies

## 2024-06-20 LAB
BACTERIA BLD CULT: NO GROWTH
BACTERIA BLD CULT: NO GROWTH

## 2025-05-25 ENCOUNTER — OFFICE VISIT (OUTPATIENT)
Dept: URGENT CARE | Facility: URGENT CARE | Age: 35
End: 2025-05-25
Payer: COMMERCIAL

## 2025-05-25 VITALS
OXYGEN SATURATION: 99 % | HEIGHT: 64 IN | HEART RATE: 81 BPM | DIASTOLIC BLOOD PRESSURE: 83 MMHG | BODY MASS INDEX: 44.82 KG/M2 | TEMPERATURE: 99 F | RESPIRATION RATE: 20 BRPM | SYSTOLIC BLOOD PRESSURE: 147 MMHG | WEIGHT: 262.5 LBS

## 2025-05-25 DIAGNOSIS — Z32.01 PREGNANCY TEST POSITIVE: ICD-10-CM

## 2025-05-25 DIAGNOSIS — R10.9 STOMACH PAIN: Primary | ICD-10-CM

## 2025-05-25 LAB
ALBUMIN SERPL-MCNC: 3.8 G/DL (ref 3.4–5)
ALBUMIN UR-MCNC: NEGATIVE MG/DL
ALP SERPL-CCNC: 59 U/L (ref 40–150)
ALT SERPL W P-5'-P-CCNC: 18 U/L (ref 0–50)
ANION GAP SERPL CALCULATED.3IONS-SCNC: 6 MMOL/L (ref 3–14)
APPEARANCE UR: CLEAR
AST SERPL W P-5'-P-CCNC: 24 U/L (ref 0–45)
BACTERIA #/AREA URNS HPF: ABNORMAL /HPF
BASOPHILS # BLD AUTO: 0 10E3/UL (ref 0–0.2)
BASOPHILS NFR BLD AUTO: 0 %
BILIRUB SERPL-MCNC: 0.9 MG/DL (ref 0.2–1.3)
BILIRUB UR QL STRIP: NEGATIVE
BUN SERPL-MCNC: 10 MG/DL (ref 7–30)
CALCIUM SERPL-MCNC: 9.6 MG/DL (ref 8.5–10.1)
CHLORIDE BLD-SCNC: 108 MMOL/L (ref 94–109)
CO2 SERPL-SCNC: 25 MMOL/L (ref 20–32)
COLOR UR AUTO: YELLOW
CREAT SERPL-MCNC: 0.6 MG/DL (ref 0.52–1.04)
EGFRCR SERPLBLD CKD-EPI 2021: >90 ML/MIN/1.73M2
EOSINOPHIL # BLD AUTO: 0.3 10E3/UL (ref 0–0.7)
EOSINOPHIL NFR BLD AUTO: 3 %
ERYTHROCYTE [DISTWIDTH] IN BLOOD BY AUTOMATED COUNT: 12.6 % (ref 10–15)
GLUCOSE BLD-MCNC: 91 MG/DL (ref 70–99)
GLUCOSE UR STRIP-MCNC: NEGATIVE MG/DL
HCG UR QL: POSITIVE
HCT VFR BLD AUTO: 37 % (ref 35–47)
HGB BLD-MCNC: 12.3 G/DL (ref 11.7–15.7)
HGB UR QL STRIP: ABNORMAL
IMM GRANULOCYTES # BLD: 0 10E3/UL
IMM GRANULOCYTES NFR BLD: 0 %
KETONES UR STRIP-MCNC: NEGATIVE MG/DL
LEUKOCYTE ESTERASE UR QL STRIP: ABNORMAL
LYMPHOCYTES # BLD AUTO: 2.6 10E3/UL (ref 0.8–5.3)
LYMPHOCYTES NFR BLD AUTO: 24 %
MCH RBC QN AUTO: 30.2 PG (ref 26.5–33)
MCHC RBC AUTO-ENTMCNC: 33.2 G/DL (ref 31.5–36.5)
MCV RBC AUTO: 91 FL (ref 78–100)
MONOCYTES # BLD AUTO: 0.5 10E3/UL (ref 0–1.3)
MONOCYTES NFR BLD AUTO: 5 %
NEUTROPHILS # BLD AUTO: 7.5 10E3/UL (ref 1.6–8.3)
NEUTROPHILS NFR BLD AUTO: 69 %
NITRATE UR QL: NEGATIVE
PH UR STRIP: 7 [PH] (ref 5–7)
PLATELET # BLD AUTO: 278 10E3/UL (ref 150–450)
POTASSIUM BLD-SCNC: 3.7 MMOL/L (ref 3.4–5.3)
PROT SERPL-MCNC: 6.6 G/DL (ref 6.8–8.8)
RBC # BLD AUTO: 4.07 10E6/UL (ref 3.8–5.2)
RBC #/AREA URNS AUTO: ABNORMAL /HPF
SODIUM SERPL-SCNC: 139 MMOL/L (ref 135–145)
SP GR UR STRIP: 1.01 (ref 1–1.03)
SQUAMOUS #/AREA URNS AUTO: ABNORMAL /LPF
UROBILINOGEN UR STRIP-ACNC: 0.2 E.U./DL
WBC # BLD AUTO: 11 10E3/UL (ref 4–11)
WBC #/AREA URNS AUTO: ABNORMAL /HPF

## 2025-05-25 PROCEDURE — 3077F SYST BP >= 140 MM HG: CPT | Performed by: FAMILY MEDICINE

## 2025-05-25 PROCEDURE — 80053 COMPREHEN METABOLIC PANEL: CPT | Performed by: FAMILY MEDICINE

## 2025-05-25 PROCEDURE — 81025 URINE PREGNANCY TEST: CPT | Performed by: FAMILY MEDICINE

## 2025-05-25 PROCEDURE — 85025 COMPLETE CBC W/AUTO DIFF WBC: CPT | Performed by: FAMILY MEDICINE

## 2025-05-25 PROCEDURE — 81001 URINALYSIS AUTO W/SCOPE: CPT | Performed by: FAMILY MEDICINE

## 2025-05-25 PROCEDURE — 36415 COLL VENOUS BLD VENIPUNCTURE: CPT | Performed by: FAMILY MEDICINE

## 2025-05-25 PROCEDURE — 83690 ASSAY OF LIPASE: CPT | Performed by: FAMILY MEDICINE

## 2025-05-25 PROCEDURE — 99204 OFFICE O/P NEW MOD 45 MIN: CPT | Performed by: FAMILY MEDICINE

## 2025-05-25 PROCEDURE — 3079F DIAST BP 80-89 MM HG: CPT | Performed by: FAMILY MEDICINE

## 2025-05-25 NOTE — PROGRESS NOTES
Urgent Care Clinic Visit    Chief Complaint   Patient presents with    Abdominal Pain     Stomach pain x 2 weeks, tightness lower abdominal, pereira, tiredness, headache, nausea, vision changes right eye blurr              5/25/2025     1:02 PM   Additional Questions   Roomed by melquiades   Accompanied by

## 2025-05-25 NOTE — PROGRESS NOTES
ASSESSMENT/  PLAN:  Stomach pain     - HCG Qual, Urine ([id number]); Future  - HCG Qual, Urine ([id number])  - UA Macroscopic with reflex to Microscopic and Culture - Lab Collect; Future  - CBC with platelets and differential; Future  - Comprehensive metabolic panel (BMP + Alb, Alk Phos, ALT, AST, Total. Bili, TP); Future  - Lipase; Future  - CBC with platelets and differential  - Comprehensive metabolic panel (BMP + Alb, Alk Phos, ALT, AST, Total. Bili, TP)  - Lipase     She has lower abdomen/ pelvic  tightness/ pressure, that she has evaluated multiple times over the past 1.5 years.  Now her symptoms are about the same and moderate, but she had positive pregnancy test at home.     Discussed that the lab results did not identify the cause of the abdominal pain, but the testing has confirmed that   she does not have some important causes of pain  Labs indicate unlikely urinary tract/ kidney infection or stone,  There is unlikely significant intraabdominal infection like appendicitis, cholecystitis or diverticulitis,  no bowel obstruction, no significant constipation.  Testing for inflammation of liver, gallbladder is negative and pancreas testing is pending.    Her pelvic/ lower abdominal symptoms now do not seem severe or different from past symptoms, with no vaginal bleeding so I am not concerned about  miscarriage or ectopic pregnancy.      Patient was counselled that if the abdominal symptoms worsen, especially with worsening pain, associated fever, chills, and/or vomiting with inability to keep down foods and liquids, blood in the urine, stool or vomitus  , vaginal bleeding, that she should be re-evaluated in the Emergency Department.        Pregnancy test positive  Confirmed she has positive pregnancy test-  same result as her home test-    She should follow up with her OB provider of choice to arrange her prenatal  care.  -----------------------------------------------------------------------------------------------------------------------------------------    SUBJECTIVE:    Chief Complaint   Patient presents with    Abdominal Pain     Stomach pain x 2 weeks, tightness lower abdominal, pereira, tiredness, headache, nausea, vision changes right eye blurr     HPI:  Ludin Dhaliwal is a 34 year old female who presents with the CC of abdominal/pelvic pain.    Pain is located in the suprapubic, RLQ, and LLQ area, with radiation to None.  The pain is characterized as tightness / pressure,  no sharp pain,   She did home pregnancy test yesterday and today with both positive-  she wants confirmation of pregnancy.-  her menses was expected 1 week ago and did not come. LMP 5 weeks ago  Pain has been present for 2 year(s) and is fluctuating.  She has had past imaging and hospitalization with no specific cause of her abdominal/ pelvic pain identified  Says she had pelvic ultrasound a few weeks ago     Surgical History :      Appendectomy   No  cholecystectomy    No   Colon or bowel surgery -  No  Diverticulitis history -   No  Endometriosis -   No  hysterectomy    No    No past medical history on file.  Patient Active Problem List   Diagnosis    Abdominal pain       ALLERGIES:  Seasonal allergies    Current Outpatient Medications   Medication Sig Dispense Refill    cetirizine (ZYRTEC) 10 MG tablet Take 10 mg by mouth daily      cholecalciferol (VITAMIN D3) 40949 units (1250 mcg) capsule Take 50,000 Units by mouth every 7 days      calcium carbonate (OS-ALEXX) 500 MG tablet Take 1 tablet by mouth 2 times daily (Patient not taking: Reported on 5/25/2025)      Cyanocobalamin (B-12) 1000 MCG TBCR  (Patient not taking: Reported on 5/25/2025)      escitalopram (LEXAPRO) 20 MG tablet Take 20 mg by mouth daily (Patient not taking: Reported on 5/25/2025)      escitalopram (LEXAPRO) 5 MG tablet Take 5 mg by mouth daily (Patient not taking: Reported on  "5/25/2025)      loratadine (CLARITIN) 10 MG tablet Take 10 mg by mouth daily (Patient not taking: Reported on 5/25/2025)       No current facility-administered medications for this visit.       Social History     Tobacco Use    Smoking status: Never    Smokeless tobacco: Never   Substance Use Topics    Alcohol use: Yes     Comment: rare       No family history on file.      ROS:  CONSTITUTIONAL:NEGATIVE for fever, chills,   INTEGUMENTARY/SKIN: NEGATIVE for worrisome rashes,  or lesions  EYES: NEGATIVE for vision changes or irritation  ENT/MOUTH: NEGATIVE for ear, mouth and throat problems    OBJECTIVE:  BP (!) 157/96   Pulse 81   Temp 99  F (37.2  C) (Tympanic)   Resp 20   Ht 1.626 m (5' 4\")   Wt 119.1 kg (262 lb 8 oz)   LMP 04/25/2025 (Approximate)   SpO2 99%   BMI 45.06 kg/m    GENERAL APPEARANCE: alert, mild distress, and cooperative  EYES: EOMI,  PERRL, conjunctiva clear  HENT: ear canals and TM's normal.  Nose and mouth without ulcers, erythema or lesions  NECK: supple, nontender, no lymphadenopathy  RESP: lungs clear to auscultation - no rales, rhonchi or wheezes  CV: regular rates and rhythm, normal S1 S2, no murmur noted  ABDOMEN: obese, soft, normal bowel sounds, tenderness mild suprapubic, RLQ, and LLQ,  no rebound, no referred pain  NEURO: Normal strength and tone, sensory exam grossly normal,  normal speech and mentation  SKIN: no suspicious lesions or rashes      Labs:    Results for orders placed or performed in visit on 05/25/25   HCG Qual, Urine ([id number])     Status: Abnormal   Result Value Ref Range    hCG Urine Qualitative Positive (A) Negative   Comprehensive metabolic panel (BMP + Alb, Alk Phos, ALT, AST, Total. Bili, TP)     Status: Abnormal   Result Value Ref Range    Sodium 139 135 - 145 mmol/L    Potassium 3.7 3.4 - 5.3 mmol/L    Chloride 108 94 - 109 mmol/L    Carbon Dioxide (CO2) 25 20 - 32 mmol/L    Anion Gap 6 3 - 14 mmol/L    Urea Nitrogen 10 7 - 30 mg/dL    Creatinine 0.60 0.52 " - 1.04 mg/dL    GFR Estimate >90 >60 mL/min/1.73m2    Calcium 9.6 8.5 - 10.1 mg/dL    Glucose 91 70 - 99 mg/dL    Alkaline Phosphatase 59 40 - 150 U/L    AST 24 0 - 45 U/L    ALT 18 0 - 50 U/L    Protein Total 6.6 (L) 6.8 - 8.8 g/dL    Albumin 3.8 3.4 - 5.0 g/dL    Bilirubin Total 0.9 0.2 - 1.3 mg/dL   CBC with platelets and differential     Status: None   Result Value Ref Range    WBC Count 11.0 4.0 - 11.0 10e3/uL    RBC Count 4.07 3.80 - 5.20 10e6/uL    Hemoglobin 12.3 11.7 - 15.7 g/dL    Hematocrit 37.0 35.0 - 47.0 %    MCV 91 78 - 100 fL    MCH 30.2 26.5 - 33.0 pg    MCHC 33.2 31.5 - 36.5 g/dL    RDW 12.6 10.0 - 15.0 %    Platelet Count 278 150 - 450 10e3/uL    % Neutrophils 69 %    % Lymphocytes 24 %    % Monocytes 5 %    % Eosinophils 3 %    % Basophils 0 %    % Immature Granulocytes 0 %    Absolute Neutrophils 7.5 1.6 - 8.3 10e3/uL    Absolute Lymphocytes 2.6 0.8 - 5.3 10e3/uL    Absolute Monocytes 0.5 0.0 - 1.3 10e3/uL    Absolute Eosinophils 0.3 0.0 - 0.7 10e3/uL    Absolute Basophils 0.0 0.0 - 0.2 10e3/uL    Absolute Immature Granulocytes 0.0 <=0.4 10e3/uL   UA Macroscopic with reflex to Microscopic and Culture - Lab Collect     Status: Abnormal    Specimen: Urine, NOS   Result Value Ref Range    Color Urine Yellow Colorless, Straw, Light Yellow, Yellow    Appearance Urine Clear Clear    Glucose Urine Negative Negative mg/dL    Bilirubin Urine Negative Negative    Ketones Urine Negative Negative mg/dL    Specific Gravity Urine 1.010 1.003 - 1.035    Blood Urine Trace (A) Negative    pH Urine 7.0 5.0 - 7.0    Protein Albumin Urine Negative Negative mg/dL    Urobilinogen Urine 0.2 0.2, 1.0 E.U./dL    Nitrite Urine Negative Negative    Leukocyte Esterase Urine Trace (A) Negative   UA Microscopic with Reflex to Culture     Status: Abnormal   Result Value Ref Range    Bacteria Urine Few (A) None Seen /HPF    RBC Urine 0-2 0-2 /HPF /HPF    WBC Urine 0-5 0-5 /HPF /HPF    Squamous Epithelials Urine Few (A) None  Seen /LPF    Narrative    Urine Culture not indicated   CBC with platelets and differential     Status: None    Narrative    The following orders were created for panel order CBC with platelets and differential.  Procedure                               Abnormality         Status                     ---------                               -----------         ------                     CBC with platelets and ...[[id number]]                      Final result                 Please view results for these tests on the individual orders.           Abdominal x-ray: -  not performed due to positive pregnancy test

## 2025-05-27 LAB — LIPASE SERPL-CCNC: 28 U/L (ref 13–60)

## 2025-06-02 ENCOUNTER — TELEPHONE (OUTPATIENT)
Dept: NURSING | Facility: CLINIC | Age: 35
End: 2025-06-02
Payer: COMMERCIAL

## 2025-06-02 NOTE — TELEPHONE ENCOUNTER
"Ludin is ~6 wks pregnant. Her first pre-miguel visit is next week.    Today, she passed a couple of \"pea sized\" blood clots.    5:15 pm - Warm tx'd to , Lynne, with [facility].    Eileen Sidhu RN  [facility]    "

## (undated) RX ORDER — LIDOCAINE HYDROCHLORIDE AND EPINEPHRINE 10; 10 MG/ML; UG/ML
INJECTION, SOLUTION INFILTRATION; PERINEURAL
Status: DISPENSED
Start: 2019-05-17